# Patient Record
Sex: MALE | Race: WHITE | URBAN - METROPOLITAN AREA
[De-identification: names, ages, dates, MRNs, and addresses within clinical notes are randomized per-mention and may not be internally consistent; named-entity substitution may affect disease eponyms.]

---

## 2022-05-04 ENCOUNTER — OFFICE VISIT (OUTPATIENT)
Dept: URBAN - METROPOLITAN AREA CLINIC 46 | Facility: CLINIC | Age: 87
End: 2022-05-04
Payer: COMMERCIAL

## 2022-05-04 DIAGNOSIS — H16.222 KERATOCONJUNCTIVITIS SICCA, NOT SPECIFIED AS SJOGREN'S, LEFT EYE: ICD-10-CM

## 2022-05-04 DIAGNOSIS — H01.005 UNSPECIFIED BLEPHARITIS LEFT LOWER EYELID: ICD-10-CM

## 2022-05-04 DIAGNOSIS — H01.002 UNSPECIFIED BLEPHARITIS RIGHT LOWER EYELID: Primary | ICD-10-CM

## 2022-05-04 PROCEDURE — 99213 OFFICE O/P EST LOW 20 MIN: CPT | Performed by: OPTOMETRIST

## 2022-05-04 RX ORDER — EYELID CLEANSER COMBINATION 1
FOAM (ML) TOPICAL
Qty: 1 | Refills: 11 | Status: ACTIVE
Start: 2022-05-04

## 2022-05-04 NOTE — IMPRESSION/PLAN
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 Plan: Dry eyes account for the patient's complaints. There is no evidence of permanent changes to the cornea. Explained condition does not have a cure and will need artificial tears for maintenance. Patient instructed to use artificial tears 4-6x/daily. Explained it may take time for eyes to acclimate completely to OTC regimen.

## 2022-05-04 NOTE — IMPRESSION/PLAN
Impression: Unspecified blepharitis right lower eyelid: H01.002. Plan: Blepharitis accounts for the patient's complaints. The condition appears chronic and eyelid scrubs with ointment have been suggested.  The patient understands this may not cure the condition and that there may be the need to be on a maintenance program.